# Patient Record
Sex: MALE | Race: WHITE | Employment: FULL TIME | URBAN - METROPOLITAN AREA
[De-identification: names, ages, dates, MRNs, and addresses within clinical notes are randomized per-mention and may not be internally consistent; named-entity substitution may affect disease eponyms.]

---

## 2024-09-18 ENCOUNTER — OFFICE VISIT (OUTPATIENT)
Age: 49
End: 2024-09-18
Payer: COMMERCIAL

## 2024-09-18 VITALS
DIASTOLIC BLOOD PRESSURE: 80 MMHG | BODY MASS INDEX: 26.51 KG/M2 | HEART RATE: 76 BPM | SYSTOLIC BLOOD PRESSURE: 117 MMHG | WEIGHT: 179 LBS | OXYGEN SATURATION: 97 % | HEIGHT: 69 IN

## 2024-09-18 DIAGNOSIS — N52.9 ERECTILE DYSFUNCTION, UNSPECIFIED ERECTILE DYSFUNCTION TYPE: Primary | ICD-10-CM

## 2024-09-18 DIAGNOSIS — I86.1 VARICOCELE: ICD-10-CM

## 2024-09-18 PROCEDURE — 99204 OFFICE O/P NEW MOD 45 MIN: CPT | Performed by: UROLOGY

## 2024-09-18 RX ORDER — TADALAFIL 20 MG/1
20 TABLET ORAL DAILY PRN
Qty: 15 TABLET | Refills: 1 | Status: SHIPPED | OUTPATIENT
Start: 2024-09-18

## 2024-09-26 LAB
TESTOST FREE SERPL-MCNC: 6.5 PG/ML (ref 6.8–21.5)
TESTOST SERPL-MCNC: 248 NG/DL (ref 264–916)

## 2024-09-30 DIAGNOSIS — R79.89 LOW TESTOSTERONE IN MALE: ICD-10-CM

## 2024-09-30 DIAGNOSIS — R79.89 LOW TESTOSTERONE IN MALE: Primary | ICD-10-CM

## 2024-10-06 LAB
ALBUMIN SERPL-MCNC: 4.7 G/DL (ref 4.1–5.1)
ALP SERPL-CCNC: 90 IU/L (ref 44–121)
ALT SERPL-CCNC: 22 IU/L (ref 0–44)
AST SERPL-CCNC: 29 IU/L (ref 0–40)
BILIRUB SERPL-MCNC: 0.5 MG/DL (ref 0–1.2)
BUN SERPL-MCNC: 10 MG/DL (ref 6–24)
BUN/CREAT SERPL: 9 (ref 9–20)
CALCIUM SERPL-MCNC: 9.6 MG/DL (ref 8.7–10.2)
CHLORIDE SERPL-SCNC: 101 MMOL/L (ref 96–106)
CO2 SERPL-SCNC: 25 MMOL/L (ref 20–29)
CREAT SERPL-MCNC: 1.12 MG/DL (ref 0.76–1.27)
EGFRCR SERPLBLD CKD-EPI 2021: 81 ML/MIN/1.73
ERYTHROCYTE [DISTWIDTH] IN BLOOD BY AUTOMATED COUNT: 12.8 % (ref 11.6–15.4)
FSH SERPL-ACNC: 2.3 MIU/ML (ref 1.5–12.4)
GLOBULIN SER CALC-MCNC: 2.3 G/DL (ref 1.5–4.5)
GLUCOSE SERPL-MCNC: 94 MG/DL (ref 70–99)
HCT VFR BLD AUTO: 47.2 % (ref 37.5–51)
HGB BLD-MCNC: 15.3 G/DL (ref 13–17.7)
LH SERPL-ACNC: 5.6 MIU/ML (ref 1.7–8.6)
MCH RBC QN AUTO: 32.6 PG (ref 26.6–33)
MCHC RBC AUTO-ENTMCNC: 32.4 G/DL (ref 31.5–35.7)
MCV RBC AUTO: 101 FL (ref 79–97)
PLATELET # BLD AUTO: 252 X10E3/UL (ref 150–450)
POTASSIUM SERPL-SCNC: 4.8 MMOL/L (ref 3.5–5.2)
PROT SERPL-MCNC: 7 G/DL (ref 6–8.5)
PSA SERPL-MCNC: 0.6 NG/ML (ref 0–4)
RBC # BLD AUTO: 4.69 X10E6/UL (ref 4.14–5.8)
SODIUM SERPL-SCNC: 139 MMOL/L (ref 134–144)
WBC # BLD AUTO: 9 X10E3/UL (ref 3.4–10.8)

## 2024-10-07 ENCOUNTER — TELEPHONE (OUTPATIENT)
Age: 49
End: 2024-10-07

## 2024-10-07 NOTE — TELEPHONE ENCOUNTER
Spoke with pt wife and advised information. She stated she would speak with her  in regards to making an appt and would call back in the morning to do so

## 2024-10-07 NOTE — TELEPHONE ENCOUNTER
Mr. Garcia has low testosterone levels; all labs are back and have been reviewed.  Does he wish to speak with Dr. Michaels before his 12/2024 visit date to discuss routes/replacement, etc.    Can we offer him at least a VV to do so?  Does not need to be scheduled urgently.    Thanks.

## 2024-10-10 LAB
TESTOST FREE SERPL-MCNC: 11.7 PG/ML (ref 6.8–21.5)
TESTOST SERPL-MCNC: 303 NG/DL (ref 264–916)

## 2024-10-11 ENCOUNTER — TELEMEDICINE (OUTPATIENT)
Age: 49
End: 2024-10-11
Payer: COMMERCIAL

## 2024-10-11 DIAGNOSIS — N52.9 ERECTILE DYSFUNCTION, UNSPECIFIED ERECTILE DYSFUNCTION TYPE: Primary | ICD-10-CM

## 2024-10-11 PROCEDURE — 99213 OFFICE O/P EST LOW 20 MIN: CPT | Performed by: UROLOGY

## 2024-10-11 RX ORDER — BUPROPION HYDROCHLORIDE 300 MG/1
300 TABLET ORAL DAILY
COMMUNITY
End: 2024-10-11

## 2024-10-11 RX ORDER — SODIUM PICOSULFATE, MAGNESIUM OXIDE, AND ANHYDROUS CITRIC ACID 12; 3.5; 1 G/175ML; G/175ML; MG/175ML
LIQUID ORAL
COMMUNITY
Start: 2024-08-16

## 2024-10-11 NOTE — ASSESSMENT & PLAN NOTE
His testosterone level was low but then came back up into the normal range, especially his free testosterone.  He does have some stresses.  We discussed how hormone levels can vary depending on mood and general health.  Given his free testosterone level, I am not inclined to start him on testosterone therapy at this point.  We can follow his levels over time and consider treatment in the future.    He he did not have success with his first dose of tadalafil.    I advised him to continue trying the tadalafil.  We discussed other options including a vacuum erection device and injection therapy.  He will consider these options and get back to me if he wants to pursue them.

## 2024-10-11 NOTE — PROGRESS NOTES
Chief Complaint   Patient presents with    Results     1. Have you been to the ER, urgent care clinic since your last visit?  Hospitalized since your last visit?No    2. Have you seen or consulted any other health care providers outside of the [facility] since your last visit?  Include any pap smears or colon screening. No

## 2024-10-11 NOTE — PROGRESS NOTES
Milan Garcia, was evaluated through a synchronous (real-time) audio-video encounter. The patient (or guardian if applicable) is aware that this is a billable service, which includes applicable co-pays. This Virtual Visit was conducted with patient's (and/or legal guardian's) consent. The visit was conducted pursuant to the emergency declaration under the Joel Act and the National Emergencies Act, 1135 waiver authority and the Coronavirus Preparedness and Response Supplemental Appropriations Act. Patient identification was verified, and a caregiver was present when appropriate. The patient was located in a state where the provider was licensed to provide care.       HISTORY OF PRESENT ILLNESS  Milan Garcia is a 48 y.o. male.   has no past medical history on file.  has a past surgical history that includes hernia repair (2020).  Chief Complaint   Patient presents with    Results     He is seen with his wife.      He is feeling normal.     He tried tadalafil on an empty stomach.  He was unable to penetrate.     He had a testosterone level 248 with a free testosterone of 6.5.  It was repeated 10/5/2024 with a testosterone of 303 and free testosterone of 11.7.    He has some prostate enlargement.   PSA was 0.6.               Component  Ref Range & Units 10/5/24 0742 9/21/24 0746   Testosterone  264 - 916 ng/dL 303 248 Low  CM   Comment: Adult male reference interval is based on a population of  healthy nonobese males (BMI <30) between 19 and 39 years old.  Ashley et.al. JCEM 2017,102;2293-7154. PMID: 83594235.   Testosterone, Free  6.8 - 21.5 pg/mL 11.7 6.5 Low         LH  1.7 - 8.6 mIU/mL 5.6   FSH  1.5 - 12.4 mIU/mL 2.3   CMP, CBC normal except     1. Erectile dysfunction, unspecified erectile dysfunction type  Assessment & Plan:   His testosterone level was low but then came back up into the normal range, especially his free testosterone.  He does have some stresses.  We discussed how hormone levels can